# Patient Record
Sex: MALE | Race: BLACK OR AFRICAN AMERICAN | Employment: FULL TIME | URBAN - METROPOLITAN AREA
[De-identification: names, ages, dates, MRNs, and addresses within clinical notes are randomized per-mention and may not be internally consistent; named-entity substitution may affect disease eponyms.]

---

## 2022-05-22 ENCOUNTER — HOSPITAL ENCOUNTER (EMERGENCY)
Age: 47
Discharge: HOME | End: 2022-05-22
Attending: EMERGENCY MEDICINE | Admitting: EMERGENCY MEDICINE
Payer: COMMERCIAL

## 2022-05-22 ENCOUNTER — APPOINTMENT (OUTPATIENT)
Dept: GENERAL RADIOLOGY | Age: 47
End: 2022-05-22
Payer: COMMERCIAL

## 2022-05-22 VITALS
OXYGEN SATURATION: 100 % | HEART RATE: 81 BPM | BODY MASS INDEX: 37.67 KG/M2 | RESPIRATION RATE: 20 BRPM | TEMPERATURE: 98.9 F | SYSTOLIC BLOOD PRESSURE: 154 MMHG | WEIGHT: 240 LBS | HEIGHT: 67 IN | DIASTOLIC BLOOD PRESSURE: 68 MMHG

## 2022-05-22 DIAGNOSIS — M72.2 PLANTAR FASCIITIS: Primary | ICD-10-CM

## 2022-05-22 PROCEDURE — 6370000000 HC RX 637 (ALT 250 FOR IP): Performed by: EMERGENCY MEDICINE

## 2022-05-22 PROCEDURE — 73630 X-RAY EXAM OF FOOT: CPT

## 2022-05-22 PROCEDURE — 99283 EMERGENCY DEPT VISIT LOW MDM: CPT

## 2022-05-22 RX ORDER — IBUPROFEN 600 MG/1
600 TABLET ORAL
Status: COMPLETED | OUTPATIENT
Start: 2022-05-22 | End: 2022-05-22

## 2022-05-22 RX ADMIN — IBUPROFEN 600 MG: 600 TABLET ORAL at 05:09

## 2022-05-22 ASSESSMENT — PAIN - FUNCTIONAL ASSESSMENT
PAIN_FUNCTIONAL_ASSESSMENT: 0-10
PAIN_FUNCTIONAL_ASSESSMENT: 0-10

## 2022-05-22 ASSESSMENT — ENCOUNTER SYMPTOMS: COLOR CHANGE: 0

## 2022-05-22 ASSESSMENT — PAIN SCALES - GENERAL
PAINLEVEL_OUTOF10: 8

## 2022-05-22 ASSESSMENT — PAIN DESCRIPTION - ORIENTATION
ORIENTATION: LEFT
ORIENTATION: LEFT

## 2022-05-22 ASSESSMENT — PAIN DESCRIPTION - DESCRIPTORS: DESCRIPTORS: ACHING

## 2022-05-22 ASSESSMENT — PAIN DESCRIPTION - LOCATION
LOCATION: ANKLE
LOCATION: ANKLE

## 2022-05-22 NOTE — ED TRIAGE NOTES
Pt ambulatory to the ed with c/o of left ankle pain. Pt states that he was running around with his children last night and all of a sudden his ankle started to hurt. Pt denies any trauma to the ankle.

## 2022-05-22 NOTE — ED PROVIDER NOTES
[facility] Emergency Department Provider Note                     PCP:                Jossie Haq MD               Age: 40 y.o. Sex: male         No diagnosis found. DISPOSITION         New Prescriptions    No medications on file       Orders Placed This Encounter   Procedures    XR FOOT LEFT (MIN 3 VIEWS)    Short walking boot        MDM  Number of Diagnoses or Management Options  Diagnosis management comments: Patient has symptoms of plantar fasciitis. He is requesting walking boot and does have much worse pain with alternating flexion and extension. We will have him do stretching exercises as well and take ibuprofen 600 mg TID. Ela Garrison MD; 5/22/2022 @4:51 AM Voice dictation software was used during the making of this note. This software is not perfect and grammatical and other typographical errors may be present. This note has not been proofread for errors.  ====================================        Amount and/or Complexity of Data Reviewed  Tests in the radiology section of CPT®: ordered and reviewed (XR FOOT LEFT (MIN 3 VIEWS)    Result Date: 5/22/2022  EXAM: Left foot x-rays. INDICATION: Pain, injury. COMPARISON: None. TECHNIQUE: 3 views. FINDINGS: The osseous and articular structures are normal. There is no acute fracture or dislocation. The soft tissues are unremarkable. Negative study.   )         Enmanuel Siddiqi is a 40 y.o. male who presents to the Emergency Department with chief complaint of    Chief Complaint   Patient presents with    Ankle Pain      Patient has left foot pain. He states it started after he was playing with his kids in the backyard running around. He states that the pain was keeping him up at night. Pain is just below his heel on that foot. I asked him about Planter fasciitis history and he has suffered from this in the past but has not bothered him in a while. He has no injuries and did not fall down. No pain anywhere else.       The history is provided by the patient. Review of Systems   Constitutional: Negative for chills and fever. Musculoskeletal: Positive for arthralgias and gait problem. Skin: Negative for color change, pallor and wound. Neurological: Negative for weakness and numbness. All other systems reviewed and are negative. No past medical history on file. No past surgical history on file. No family history on file. Social Connections:     Frequency of Communication with Friends and Family: Not on file    Frequency of Social Gatherings with Friends and Family: Not on file    Attends [employer] Services: Not on file    Active Member of Clubs or Organizations: Not on file    Attends Club or Organization Meetings: Not on file    Marital Status: Not on file        No Known Allergies     Vitals signs and nursing note reviewed. Patient Vitals for the past 4 hrs:   Temp Pulse Resp SpO2   05/22/22 0345 99.5 °F (37.5 °C) 83 16 98 %          Physical Exam  Vitals and nursing note reviewed. Constitutional:       General: He is not in acute distress. Appearance: He is not ill-appearing, toxic-appearing or diaphoretic. HENT:      Head: Normocephalic and atraumatic. Eyes:      General: No scleral icterus. Conjunctiva/sclera: Conjunctivae normal.   Pulmonary:      Effort: Pulmonary effort is normal. No respiratory distress. Breath sounds: No stridor. Musculoskeletal:         General: Tenderness (there is pain to the inferior aspect of the left calcanous and tenderness of the plantar fascia region) present. No swelling, deformity or signs of injury. Cervical back: Normal range of motion and neck supple. Neurological:      Mental Status: He is alert. Mental status is at baseline.    Psychiatric:         Mood and Affect: Mood normal.         Behavior: Behavior normal.          Procedures    Labs Reviewed - No data to display     XR FOOT LEFT (MIN 3 VIEWS)   Final Result   Negative study.               Brenda Coma Scale  Eye Opening: Spontaneous  Best Verbal Response: Oriented  Best Motor Response: Obeys commands  [place] Coma Scale Score: 15             CIWA Assessment  Pulse: 83                       Voice dictation software was used during the making of this note. This software is not perfect and grammatical and other typographical errors may be present. This note has not been completely proofread for errors.       Inga Escobar MD  05/22/22 6470       Inga Escobar MD  05/22/22 5926